# Patient Record
Sex: MALE | Race: WHITE | Employment: FULL TIME
[De-identification: names, ages, dates, MRNs, and addresses within clinical notes are randomized per-mention and may not be internally consistent; named-entity substitution may affect disease eponyms.]

---

## 2023-02-12 ENCOUNTER — HOSPITAL ENCOUNTER (EMERGENCY)
Facility: HOSPITAL | Age: 38
Discharge: HOME | End: 2023-02-13
Attending: EMERGENCY MEDICINE
Payer: COMMERCIAL

## 2023-02-12 DIAGNOSIS — N20.1 URETERIC STONE: ICD-10-CM

## 2023-02-12 DIAGNOSIS — N20.0 KIDNEY STONE: Primary | ICD-10-CM

## 2023-02-12 PROCEDURE — 80048 BASIC METABOLIC PNL TOTAL CA: CPT

## 2023-02-12 PROCEDURE — 96374 THER/PROPH/DIAG INJ IV PUSH: CPT

## 2023-02-12 PROCEDURE — 99284 EMERGENCY DEPT VISIT MOD MDM: CPT

## 2023-02-12 PROCEDURE — 85025 COMPLETE CBC W/AUTO DIFF WBC: CPT

## 2023-02-12 PROCEDURE — 81001 URINALYSIS AUTO W/SCOPE: CPT

## 2023-02-13 ENCOUNTER — APPOINTMENT (OUTPATIENT)
Facility: HOSPITAL | Age: 38
End: 2023-02-13
Payer: COMMERCIAL

## 2023-02-13 VITALS
BODY MASS INDEX: 34.3 KG/M2 | RESPIRATION RATE: 22 BRPM | HEIGHT: 71 IN | TEMPERATURE: 97.8 F | HEART RATE: 86 BPM | DIASTOLIC BLOOD PRESSURE: 78 MMHG | SYSTOLIC BLOOD PRESSURE: 128 MMHG | WEIGHT: 245 LBS | OXYGEN SATURATION: 96 %

## 2023-02-13 LAB
ANION GAP SERPL CALC-SCNC: 5 MMOL/L (ref 3–18)
APPEARANCE UR: ABNORMAL
BACTERIA URNS QL MICRO: NEGATIVE /HPF
BASOPHILS # BLD: 0.1 K/UL (ref 0–0.1)
BASOPHILS NFR BLD: 1 % (ref 0–2)
BILIRUB UR QL: NEGATIVE
BUN SERPL-MCNC: 13 MG/DL (ref 7–18)
BUN/CREAT SERPL: 11 (ref 12–20)
CALCIUM SERPL-MCNC: 8.9 MG/DL (ref 8.5–10.1)
CAOX CRY URNS QL MICRO: ABNORMAL
CHLORIDE SERPL-SCNC: 105 MMOL/L (ref 100–111)
CO2 SERPL-SCNC: 28 MMOL/L (ref 21–32)
COLOR UR: YELLOW
CREAT SERPL-MCNC: 1.15 MG/DL (ref 0.6–1.3)
DIFFERENTIAL METHOD BLD: ABNORMAL
EOSINOPHIL # BLD: 0.1 K/UL (ref 0–0.4)
EOSINOPHIL NFR BLD: 1 % (ref 0–5)
EPITH CASTS URNS QL MICRO: ABNORMAL /LPF (ref 0–5)
ERYTHROCYTE [DISTWIDTH] IN BLOOD BY AUTOMATED COUNT: 12.7 % (ref 11.6–14.5)
GLUCOSE SERPL-MCNC: 134 MG/DL (ref 74–99)
GLUCOSE UR STRIP.AUTO-MCNC: NEGATIVE MG/DL
HCT VFR BLD AUTO: 43.3 % (ref 36–48)
HGB BLD-MCNC: 15.2 G/DL (ref 13–16)
HGB UR QL STRIP: ABNORMAL
IMM GRANULOCYTES # BLD AUTO: 0 K/UL (ref 0–0.04)
IMM GRANULOCYTES NFR BLD AUTO: 0 % (ref 0–0.5)
KETONES UR QL STRIP.AUTO: ABNORMAL MG/DL
LEUKOCYTE ESTERASE UR QL STRIP.AUTO: ABNORMAL
LYMPHOCYTES # BLD: 1.3 K/UL (ref 0.9–3.6)
LYMPHOCYTES NFR BLD: 19 % (ref 21–52)
MCH RBC QN AUTO: 30.2 PG (ref 24–34)
MCHC RBC AUTO-ENTMCNC: 35.1 G/DL (ref 31–37)
MCV RBC AUTO: 86.1 FL (ref 78–100)
MONOCYTES # BLD: 0.5 K/UL (ref 0.05–1.2)
MONOCYTES NFR BLD: 7 % (ref 3–10)
NEUTS SEG # BLD: 4.9 K/UL (ref 1.8–8)
NEUTS SEG NFR BLD: 72 % (ref 40–73)
NITRITE UR QL STRIP.AUTO: NEGATIVE
NRBC # BLD: 0 K/UL (ref 0–0.01)
NRBC BLD-RTO: 0 PER 100 WBC
PH UR STRIP: 5.5 (ref 5–8)
PLATELET # BLD AUTO: 232 K/UL (ref 135–420)
PMV BLD AUTO: 11.2 FL (ref 9.2–11.8)
POTASSIUM SERPL-SCNC: 3.8 MMOL/L (ref 3.5–5.5)
PROT UR STRIP-MCNC: 30 MG/DL
RBC # BLD AUTO: 5.03 M/UL (ref 4.35–5.65)
RBC #/AREA URNS HPF: >100 /HPF (ref 0–5)
SODIUM SERPL-SCNC: 138 MMOL/L (ref 136–145)
SP GR UR REFRACTOMETRY: 1.02 (ref 1–1.03)
UROBILINOGEN UR QL STRIP.AUTO: 0.2 EU/DL (ref 0.2–1)
WBC # BLD AUTO: 6.8 K/UL (ref 4.6–13.2)
WBC URNS QL MICRO: ABNORMAL /HPF (ref 0–5)

## 2023-02-13 PROCEDURE — 6360000002 HC RX W HCPCS: Performed by: EMERGENCY MEDICINE

## 2023-02-13 PROCEDURE — 74176 CT ABD & PELVIS W/O CONTRAST: CPT

## 2023-02-13 RX ORDER — ONDANSETRON 4 MG/1
4 TABLET, FILM COATED ORAL DAILY PRN
Qty: 30 TABLET | Refills: 0 | Status: SHIPPED | OUTPATIENT
Start: 2023-02-13

## 2023-02-13 RX ORDER — IBUPROFEN 600 MG/1
600 TABLET ORAL EVERY 6 HOURS PRN
Qty: 120 TABLET | Refills: 0 | Status: SHIPPED | OUTPATIENT
Start: 2023-02-13

## 2023-02-13 RX ORDER — TAMSULOSIN HYDROCHLORIDE 0.4 MG/1
0.4 CAPSULE ORAL DAILY
Qty: 7 CAPSULE | Refills: 0 | Status: SHIPPED | OUTPATIENT
Start: 2023-02-13 | End: 2023-02-20

## 2023-02-13 RX ORDER — KETOROLAC TROMETHAMINE 15 MG/ML
30 INJECTION, SOLUTION INTRAMUSCULAR; INTRAVENOUS
Status: COMPLETED | OUTPATIENT
Start: 2023-02-13 | End: 2023-02-13

## 2023-02-13 RX ORDER — PROCHLORPERAZINE EDISYLATE 5 MG/ML
10 INJECTION INTRAMUSCULAR; INTRAVENOUS
Status: DISCONTINUED | OUTPATIENT
Start: 2023-02-13 | End: 2023-02-13

## 2023-02-13 RX ORDER — DIPHENHYDRAMINE HYDROCHLORIDE 50 MG/ML
25 INJECTION INTRAMUSCULAR; INTRAVENOUS
Status: DISCONTINUED | OUTPATIENT
Start: 2023-02-13 | End: 2023-02-13

## 2023-02-13 RX ADMIN — KETOROLAC TROMETHAMINE 30 MG: 15 INJECTION, SOLUTION INTRAMUSCULAR; INTRAVENOUS at 04:26

## 2023-02-13 NOTE — ED TRIAGE NOTES
Pt arrived with c.o L sided flank pain that started around 8pm this evening. Pt also endorses vomiting this evening. Denies diarrhea. Denies abd pain. Pt worried it might be a kidney stone.

## 2023-02-13 NOTE — ED PROVIDER NOTES
EMERGENCY DEPARTMENT HISTORY AND PHYSICAL EXAM      Date: 2/12/2023  Patient Name: Delle Phoenix    History of Presenting Illness     Chief Complaint   Patient presents with    Flank Pain       History Provided By: Patient    HPI: Delle Phoenix, 42 y.o. male with no pertinent past medical history presents the emergency room with chief complaint of left flank pain, nausea vomiting. Patient noted onset of symptoms earlier today. He describes that pain initially is moderate to severe, has somewhat improved since onset. Pain begins in the left lower back and radiates to his left lower quadrant. He does have some associated nausea with several episodes of emesis earlier today. Pt denies any other alleviating or exacerbating factors. Additionally, pt specifically denies any recent fever, chills, headache,  CP, SOB, lightheadedness, dizziness, numbness, weakness, BLE swelling, heart palpitations, urinary sxs, diarrhea, constipation, melena, hematochezia, cough, or congestion. PCP: PROVIDER LUKE, MD    No current facility-administered medications for this encounter. Current Outpatient Medications   Medication Sig Dispense Refill    ondansetron (ZOFRAN) 4 MG tablet Take 1 tablet by mouth daily as needed for Nausea or Vomiting 30 tablet 0    ibuprofen (IBU) 600 MG tablet Take 1 tablet by mouth every 6 hours as needed for Pain 120 tablet 0    tamsulosin (FLOMAX) 0.4 MG capsule Take 1 capsule by mouth daily for 7 days 7 capsule 0       Past History     Past Medical History:  History reviewed, no pertinent past medical history    Past Surgical History:  No past surgical history on file. Family History:  No family history on file. Social History:  No current heavy alcohol or illicit drug use    Allergies:   Allergies   Allergen Reactions    Hydrocodone-Acetaminophen Nausea And Vomiting         Review of Systems   Review of Systems  Pertinent positives and negatives in HPI    Physical Exam   Physical Exam    GENERAL: alert and oriented, no acute distress  EYES: PEERL, No injection, discharge or icterus. ENT: Mucous membranes pink and moist.  NECK: Supple  LUNGS: Airway patent. Non-labored respirations. Breath sounds clear with good air entry bilaterally. HEART: Regular rate and rhythm. No peripheral edema  ABDOMEN: Non-distended and non-tender, without guarding or rebound. SKIN:  warm, dry  MSK/EXTREMITIES: Without swelling, tenderness or deformity, symmetric with normal ROM  NEUROLOGICAL: Alert, oriented      Diagnostic Study Results     Labs -     No results found for this or any previous visit (from the past 12 hour(s)). Radiologic Studies -   CT ABDOMEN PELVIS WO CONTRAST Additional Contrast? None   Final Result              Medical Decision Making       I reviewed the vital signs, available nursing notes, past medical history, past surgical history, family history and social history. Vital Signs-Reviewed the patient's vital signs. Patient Vitals for the past 12 hrs:   BP SpO2   02/13/23 0355 128/78 96 %   02/13/23 0315 -- 98 %       Provider Notes (Medical Decision Making): The patient presents with a chief complaint of abdominal pain. Differential diagnosis is broad and includes acute appendicitis, acute cholecystitis, pancreatitis, gastritis, PUD, diverticulitis, mesenteric ischemia, abdominal aortic aneurysm, aortic dissection, bowel obstruction, enteritis, colitis, fecal impaction, volvulus, IBS, inflammatory bowel disease, specific food intolerance, peritonitis, perforated viscous, malignancy, UTI, abscess, testicular torsion, epididymitis, orchitis, testicular torsion and abdominal pain NOS. Basic labs my interpretation showed no clinically significant abnormalities, no sign of infection on urinalysis my interpretation. CT scan of the abdomen pelvis was obtained which showed obstructed left ureteral stone which would explain the patient's symptoms.   Patient was given Toradol with improvement, pain is well tolerated at this time is felt appropriate for outpatient management. Pt is tolerating po. The patient states that their symptoms have improved and he feels much better. There are no other new complaints at this time      There were no concerns for any acute life-threatening or surgical pathology that would warrant admission at this time. Vital signs were within acceptable limits at the time of discharge. Patient was in stable condition and felt to be reliable for outpatient management. There were no lab findings of immediate concern. The patient was advised to return to the emergency room for acutely worsening pain, persistence of pain, fevers, , intractable vomiting , inability to tolerate food/liquids, syncope, or change in mental status. Otherwise, the patient is encouraged to follow-up with urology. The patient understood the work-up and assessment and had no further questions. The patient was discharged home in stable clinical condition. ED Course:   Initial assessment performed. The patients presenting problems have been discussed, and they are in agreement with the care plan formulated and outlined with them. I have encouraged them to ask questions as they arise throughout their visit. Patient was given the following medications:  Medications   ketorolac (TORADOL) injection 30 mg (30 mg IntraVENous Given 2/13/23 0426)          [address]  results have been reviewed with him. He has been counseled regarding his diagnosis. He verbally conveys understanding and agreement of the signs, symptoms, diagnosis, treatment and prognosis and additionally agrees to follow up as recommended. He also agrees with the care-plan and conveys that all of his questions have been answered.   I have also put together some discharge instructions for him that include: 1) educational information regarding their diagnosis, 2) how to care for their diagnosis at home, as well a 3) list of reasons why they would want to return to the ED prior to their follow-up appointment, should their condition change. Disposition:  home    PLAN:  1. Medication List        START taking these medications      ibuprofen 600 MG tablet  Commonly known as: IBU  Take 1 tablet by mouth every 6 hours as needed for Pain     ondansetron 4 MG tablet  Commonly known as: ZOFRAN  Take 1 tablet by mouth daily as needed for Nausea or Vomiting     tamsulosin 0.4 MG capsule  Commonly known as: FLOMAX  Take 1 capsule by mouth daily for 7 days               Where to Get Your Medications        These medications were sent to [place] [id number] [facility] [phone] - [phone]  [facility], [facility] [address]      Phone: [phone]   ibuprofen 600 MG tablet  ondansetron 4 MG tablet  tamsulosin 0.4 MG capsule       2. [unfilled]    Return to ED if worse     Diagnosis     Clinical Impression:   1. Kidney stone    2. Ureteric stone          I, Liang Garcia MD am the first provider for this patient and am the attending of record for this patient encounter. Liang Garcia MD        Please note that this dictation was completed with Dragon, computer voice recognition software. Quite often unanticipated grammatical, syntax, homophones, and other interpretive errors are inadvertently transcribed by the computer software. Please disregard these errors. Additionally, please excuse any errors that have escaped final proofreading.               Yamileth Lassiter MD  02/13/23 6100

## 2023-03-10 ENCOUNTER — TRANSCRIBE ORDERS (OUTPATIENT)
Facility: HOSPITAL | Age: 38
End: 2023-03-10

## 2023-03-10 DIAGNOSIS — N20.0 KIDNEY STONE: Primary | ICD-10-CM

## 2023-03-20 ENCOUNTER — HOSPITAL ENCOUNTER (OUTPATIENT)
Facility: HOSPITAL | Age: 38
Setting detail: OUTPATIENT SURGERY
Discharge: HOME | End: 2023-03-20
Attending: UROLOGY | Admitting: UROLOGY
Payer: COMMERCIAL

## 2023-03-20 VITALS
WEIGHT: 252.5 LBS | DIASTOLIC BLOOD PRESSURE: 78 MMHG | HEART RATE: 102 BPM | OXYGEN SATURATION: 95 % | SYSTOLIC BLOOD PRESSURE: 133 MMHG | HEIGHT: 71 IN | TEMPERATURE: 98.2 F | BODY MASS INDEX: 35.35 KG/M2 | RESPIRATION RATE: 18 BRPM

## 2023-03-20 DIAGNOSIS — N13.2 URETERAL STONE WITH HYDRONEPHROSIS: Primary | ICD-10-CM

## 2023-03-20 PROCEDURE — 7100000011 HC PHASE II RECOVERY - ADDTL 15 MIN: Performed by: UROLOGY

## 2023-03-20 PROCEDURE — 2709999900 HC NON-CHARGEABLE SUPPLY: Performed by: UROLOGY

## 2023-03-20 PROCEDURE — 3600000001 HC SURGERY LEVEL 1  BASE: Performed by: UROLOGY

## 2023-03-20 PROCEDURE — 50590 FRAGMENTING OF KIDNEY STONE: CPT

## 2023-03-20 PROCEDURE — 6360000002 HC RX W HCPCS: Performed by: UROLOGY

## 2023-03-20 PROCEDURE — 2580000003 HC RX 258: Performed by: UROLOGY

## 2023-03-20 PROCEDURE — 99152 MOD SED SAME PHYS/QHP 5/>YRS: CPT | Performed by: UROLOGY

## 2023-03-20 PROCEDURE — 3600000011 HC SURGERY LEVEL 1  ADDTL 15MIN: Performed by: UROLOGY

## 2023-03-20 PROCEDURE — 99153 MOD SED SAME PHYS/QHP EA: CPT | Performed by: UROLOGY

## 2023-03-20 PROCEDURE — 7100000010 HC PHASE II RECOVERY - FIRST 15 MIN: Performed by: UROLOGY

## 2023-03-20 RX ORDER — PROMETHAZINE HYDROCHLORIDE 25 MG/1
TABLET ORAL
COMMUNITY
Start: 2018-03-21

## 2023-03-20 RX ORDER — SODIUM CHLORIDE, SODIUM LACTATE, POTASSIUM CHLORIDE, CALCIUM CHLORIDE 600; 310; 30; 20 MG/100ML; MG/100ML; MG/100ML; MG/100ML
INJECTION, SOLUTION INTRAVENOUS CONTINUOUS
Status: DISCONTINUED | OUTPATIENT
Start: 2023-03-20 | End: 2023-03-20 | Stop reason: HOSPADM

## 2023-03-20 RX ORDER — FLUMAZENIL 0.1 MG/ML
0.5 INJECTION INTRAVENOUS ONCE
Status: DISCONTINUED | OUTPATIENT
Start: 2023-03-20 | End: 2023-03-20 | Stop reason: HOSPADM

## 2023-03-20 RX ORDER — NALOXONE HYDROCHLORIDE 0.4 MG/ML
1 INJECTION, SOLUTION INTRAMUSCULAR; INTRAVENOUS; SUBCUTANEOUS PRN
Status: DISCONTINUED | OUTPATIENT
Start: 2023-03-20 | End: 2023-03-20 | Stop reason: HOSPADM

## 2023-03-20 RX ORDER — FENTANYL CITRATE 50 UG/ML
300 INJECTION, SOLUTION INTRAMUSCULAR; INTRAVENOUS PRN
Status: DISCONTINUED | OUTPATIENT
Start: 2023-03-20 | End: 2023-03-20 | Stop reason: HOSPADM

## 2023-03-20 RX ORDER — OXYCODONE HYDROCHLORIDE AND ACETAMINOPHEN 5; 325 MG/1; MG/1
1 TABLET ORAL EVERY 4 HOURS PRN
Status: DISCONTINUED | OUTPATIENT
Start: 2023-03-20 | End: 2023-03-20 | Stop reason: HOSPADM

## 2023-03-20 RX ORDER — MIDAZOLAM HYDROCHLORIDE 1 MG/ML
6 INJECTION INTRAMUSCULAR; INTRAVENOUS PRN
Status: DISCONTINUED | OUTPATIENT
Start: 2023-03-20 | End: 2023-03-20 | Stop reason: HOSPADM

## 2023-03-20 RX ORDER — TRAMADOL HYDROCHLORIDE 50 MG/1
50 TABLET ORAL EVERY 6 HOURS PRN
Qty: 20 TABLET | Refills: 0 | Status: SHIPPED | OUTPATIENT
Start: 2023-03-20 | End: 2023-03-27

## 2023-03-20 RX ADMIN — SODIUM CHLORIDE, SODIUM LACTATE, POTASSIUM CHLORIDE, AND CALCIUM CHLORIDE: 600; 310; 30; 20 INJECTION, SOLUTION INTRAVENOUS at 16:05

## 2023-03-20 RX ADMIN — CEFAZOLIN 2000 MG: 10 INJECTION, POWDER, FOR SOLUTION INTRAVENOUS at 17:00

## 2023-03-20 ASSESSMENT — PAIN SCALES - GENERAL
PAINLEVEL_OUTOF10: 5
PAINLEVEL_OUTOF10: 5

## 2023-03-20 ASSESSMENT — PAIN DESCRIPTION - ORIENTATION
ORIENTATION: LEFT
ORIENTATION: LEFT

## 2023-03-20 ASSESSMENT — PAIN DESCRIPTION - DESCRIPTORS
DESCRIPTORS: ACHING
DESCRIPTORS: ACHING

## 2023-03-20 ASSESSMENT — PAIN DESCRIPTION - LOCATION
LOCATION: BUTTOCKS
LOCATION: BUTTOCKS

## 2023-03-20 ASSESSMENT — PAIN - FUNCTIONAL ASSESSMENT: PAIN_FUNCTIONAL_ASSESSMENT: 0-10

## 2023-03-20 NOTE — DISCHARGE INSTRUCTIONS
2 [facility], Urology     [facility], [address], [address], Ludie [address]inski, [place]  Office: [phone]  Fax:    [phone]    PROCEDURE     Left ureteral eswl  __  NOTIFY [facility] IMMEDIATELY IF ANY OF THE FOLLOWING OCCUR:    You are unable to urinate. Urgency to urinate is not uncommon. You find yourself urinating small frequent amounts associated with severe lower abdominal discomfort. Bright red blood with clots in the urine. Some reddish urine is not uncommon and should be treated with increasing the amount of fluids you drink. Temperature above 101.5° and / or chills. You are nauseous and / or vomiting and you cannot hold down any fluids. Your pain is not controlled with the pain medication prescribed. SPECIAL CONSIDERATIONS:     Do not drive for at least 24 hours after the procedure and until you are no longer taking narcotic pain medication and you are able to move and react without hesitation. You may return to work in 24-48 hours          _x_  Strain all urine and bring all stone fragments to your follow up visit. _x_  No heavy lifting over 10 pounds & no strenuous exercise for 1 week    _x_  Our office will call you to make an appointment in 2-3 weeks. Please contact [facility] at [phone] or go to the nearest Emergency Department / Urgent Care facility for any other medical questions or concerns.

## 2023-03-20 NOTE — OP NOTE
Operative Note      Patient: Anita Olea  YOB: 1985  MRN: [id number]    Date of Procedure: 3/20/2023    Pre-Op Diagnosis: CALCULUS OF Shahzad Caulk    Post-Op Diagnosis: Same       Procedure(s):  LEFT URETERAL EXTRA CORPAREAL SHOCKWAVE LITHOTRIPSY    Surgeon(s):  Maribeth Rose MD    Assistant:   * No surgical staff found *    Anesthesia: IV Sedation    Estimated Blood Loss (mL): 0 ml    Complications: None    Specimens:   * No specimens in log *    Implants:  * No implants in log *      Drains: * No LDAs found *    Findings: Left distal ureteral stone near UVJ. Description of Procedure: The patient was brought into the OR and was identified. Time out was called using two identifiers. He received conscious sedation using fentanyl and versed as needed by the nurse. The F1 and F2 focus points were used to locate the stone on the Left distal ureter near UVJ. Once the stone was localized, the [employer] lithotripter machine was used for the procedure. The rate was 90 shocks per minute. Power was increased from 3 to 7. A total shock of 4000 was applied to the stone. At the completion, the stone looked to be fragmented well. The patient tolerated the procedure very well. He was aroused safely and transferred to the PACU in stable condition. Plan:  KUB in 2 weeks prior to office visit.       Electronically signed by Rose Winter MD on 3/20/2023 at 5:52 PM

## 2023-03-20 NOTE — PRE SEDATION
Sedation Pre-Procedure Note    Patient Name: Anita Olea   YOB: 1985  Room/Bed: OR/PL  Medical Record Number: [id number]  Date: 3/20/2023   Time: 4:55 PM       Indication:  left ureteral stone    Consent: I have discussed with the patient and/or the patient representative the indication, alternatives, and the possible risks and/or complications of the planned procedure and the anesthesia methods. The patient and/or patient representative appear to understand and agree to proceed. Vital Signs:   Vitals:    03/20/23 1535   BP: (!) 158/87   Pulse: 99   Resp: 18   Temp: 98.2 °F (36.8 °C)   SpO2: 100%       Past Medical History:   has a past medical history of Exercise tolerance finding, Kidney stone, and Wears glasses. Past Surgical History:   has a past surgical history that includes Vasectomy (2018) and [place] tooth extraction (2000). Medications:   Scheduled Meds:    ceFAZolin  2,000 mg IntraVENous On Call to OR    flumazenil  0.5 mg IntraVENous Once     Continuous Infusions:    lactated ringers IV soln 125 mL/hr at 03/20/23 1605    lactated ringers IV soln       PRN Meds: naloxone, midazolam, fentaNYL, oxyCODONE-acetaminophen  Home Meds:   Prior to Admission medications    Medication Sig Start Date End Date Taking?  Authorizing Provider   Multiple Vitamins-Iron TABS MULTIVITAMIN ADULT TABS 3/21/18  Yes Historical Provider, MD   tamsulosin (FLOMAX) 0.4 MG capsule Take 0.4 mg by mouth daily Indications: kidney stone    Historical Provider, MD   cetirizine (ZYRTEC) 10 MG tablet Take 10 mg by mouth daily Indications: allergies    Historical Provider, MD   fluticasone (FLONASE) 50 MCG/ACT nasal spray 1 spray by Each Nostril route daily Indications: allergies    Historical Provider, MD   ibuprofen (IBU) 600 MG tablet Take 1 tablet by mouth every 6 hours as needed for Pain 2/13/23   Jessica Grossman MD     Coumadin Use Last 7 Days:  no  Antiplatelet drug therapy use last 7 days: no  Other 04-Apr-2022 09:33

## 2023-03-20 NOTE — H&P
Update History & Physical    The patient's History and Physical of March 10, 2023 was reviewed with the patient and I examined the patient. There was no change. The surgical site was confirmed by the patient and me. kUB 3/16 5x3mm left UVJ stone. Unchanged. Plan: The risks, benefits, expected outcome, and alternative to the recommended procedure have been discussed with the patient. Patient understands and wants to proceed with the procedure. Electronically signed by Regina Kaufman MD on 3/20/2023 at 4:53 PM        [facility] [place]  [address]  Tel: [phone]  Fax: [phone]    Patient :  [place] Marker  YOB: 1985  Birth Sex:  Male  Current Gender:  Male  Date:   03/10/2023 10:15 AM   Visit Type:    Office Visit  Assessment/Plan  #  Detail Type  Description   1. Assessment  Renal calculi (N20.0). Patient Plan  Patient thought he had a 2 mm stone and we just received his CT scan and it was actually 4 mm left mid ureteral stone. He has an order for tamsulosin but he did not take any as he has had absolutely no signs and symptoms of renal colic. Made patient aware that the stone could possibly still be there is a or 2 mm I would be sure it was gone but it being 4 mm and he is been straining his urine consistently he has not passed a 4 mm stone. Plan:  1. CT stone protocol for mid left ureteral 4 mm stone presence or absence. If the stone remains patient needs come back in to discuss surgical options and if the stone has passed then he will get a phone call for me letting him know that they are gone and he can follow-up on an as-needed basis. Additional Visit Information      This 42year old male presents for Kidney stones. History of Present Illness  1. Kidney stones   Onset was sudden. Severity level is moderate-severe. The problem is resolved. Location of pain is left flank.  CT, ureters and kidneys taken 02/12/2023 reveals a 2 mm stone in the left. Recent ER visit on 02/12/2023 at [facility]. Pertinent history includes family history of stones. There are no aggravating factors. Relieving factors include analgesics. Pertinent negatives include back pain, chills, constipation, diarrhea, dysuria, fever, nausea, pain, suprapubic pain, dribbling (urinary) and vomiting. Additional information: 42year old male patient with his first kidney stone. Strong family hx of stones on fathers side. Pt. reports 2 mm stone at left ureter CT results are not available. He thinks he passed stone as he has no renal colic S&S for 2 weeks. no strainer from ER. Denies fever and chills. .       Comments: 03/10/2023:  Patient returns urology clinic today for 1-2 week follow-up for his 4 mm left mid ureteral stone. Patient at our 1st meeting thought he had a 2 mm left ureteral stone but actually on CT scan which we just received was a 4 mm. He has punctate nonobstructing stones in the right kidney. Patient was not having any pain at the time I saw him last and we did prescribe Flomax from last time he was here and he did not need it. Denies flank pain, hematuria, dysuria, urgency frequency, nausea vomiting, fever or chills. Past Medical/Surgical History   (Reviewed, updated)  Disease/disorder  Onset Date  Management  Date  Comments      Vasectomy          Medications (active prior to today)  Medication  Instructions  Start Date  Stop Date  Refilled  Elsewhere  ketorolac 10 mg tablet  take 1 tablet by oral route  every 6 hours as needed for up to 5 days total use as needed  02/27/2023      N  tamsulosin 0.4 mg capsule  take 1 capsule by oral route  every day 1/2 hour following the same meal each day  02/27/2023      N      Medication Reconciliation  Medications reconciled today.       Allergies  Ingredient  Reaction (Severity)  Medication Name  Comment  HYDROCODONE  Nausea/vomiting            Family History   (Reviewed, updated)    Relationship  Family Member Name    Age at Death  Condition  Onset Age  Cause of Death  Family h/o        Kidney stones        Social History  (Reviewed, updated)  Tobacco use reviewed. Preferred language is [place]. Marital Status/Family/Social Support  Marital status:     Tobacco use status: Current non-smoker. Smoking status: Never smoker. Tobacco Screening  Patient has never used tobacco. Patient has not used tobacco in the last 30 days. Patient has not used smokeless tobacco in the last 30 days. Smoking Status  Type  Smoking Status  Usage Per Day  Years Used  Pack Years  Total Pack Years    Never smoker            Alcohol  There is a history of alcohol use. Caffeine  The patient uses caffeine: coffee. Review of Systems  System  Neg/Pos  Details  Constitutional  Negative  Chills and Fever. ENMT  Negative  Ear infections and Sore throat. Eyes  Negative  Blurred vision, Double vision and Eye pain. Respiratory  Negative  Asthma, Chronic cough, Dyspnea and Wheezing. Cardio  Negative  Chest pain. GI  Negative  Constipation, Decreased appetite, Diarrhea, Nausea and Vomiting. Endocrine  Negative  Cold intolerance, Heat intolerance, Increased thirst and Weight loss. Neuro  Negative  Headache and Tremors. Psych  Negative  Anxiety and Depression. Integumentary  Negative  Itching skin and Rash. MS  Negative  Back pain and Joint pain. Hema/Lymph  Negative  Easy bleeding. Reproductive  Negative  Sexual dysfunction.       Vital Signs  Height  Time  ft  in  cm  Last Measured  Height Position  10:50 AM  5.0  11.00  180.34  2023  0    Weight/BSA/BMI  Time  lb  oz  kg  Context  BMI kg/m2  BSA m2  10:50 AM  254.00    115.212  dressed with shoes  35.43      Temperature/Pulse/Respiration  Time  Temp F  Temp C  Temp Site  Pulse/min  Pattern  Resp/ min  10:50 AM  98.30  36.83  ear          Measured by  Time  Measured by  10:50 AM  [facility] Day    Physical

## 2024-09-21 ENCOUNTER — HOSPITAL ENCOUNTER (EMERGENCY)
Facility: HOSPITAL | Age: 39
Discharge: HOME | End: 2024-09-22
Attending: EMERGENCY MEDICINE
Payer: COMMERCIAL

## 2024-09-21 ENCOUNTER — APPOINTMENT (OUTPATIENT)
Facility: HOSPITAL | Age: 39
End: 2024-09-21
Payer: COMMERCIAL

## 2024-09-21 DIAGNOSIS — K76.0 HEPATIC STEATOSIS: ICD-10-CM

## 2024-09-21 DIAGNOSIS — R10.9 RIGHT FLANK PAIN: ICD-10-CM

## 2024-09-21 DIAGNOSIS — N20.0 KIDNEY STONE: Primary | ICD-10-CM

## 2024-09-21 LAB
BASOPHILS # BLD: 0 K/UL (ref 0–0.1)
BASOPHILS NFR BLD: 0 % (ref 0–2)
DIFFERENTIAL METHOD BLD: ABNORMAL
EOSINOPHIL # BLD: 0 K/UL (ref 0–0.4)
EOSINOPHIL NFR BLD: 0 % (ref 0–5)
ERYTHROCYTE [DISTWIDTH] IN BLOOD BY AUTOMATED COUNT: 12.7 % (ref 11.6–14.5)
HCT VFR BLD AUTO: 40.8 % (ref 36–48)
HGB BLD-MCNC: 14.5 G/DL (ref 13–16)
IMM GRANULOCYTES # BLD AUTO: 0 K/UL (ref 0–0.04)
IMM GRANULOCYTES NFR BLD AUTO: 0 % (ref 0–0.5)
LYMPHOCYTES # BLD: 1 K/UL (ref 0.9–3.6)
LYMPHOCYTES NFR BLD: 9 % (ref 21–52)
MCH RBC QN AUTO: 30.5 PG (ref 24–34)
MCHC RBC AUTO-ENTMCNC: 35.5 G/DL (ref 31–37)
MCV RBC AUTO: 85.7 FL (ref 78–100)
MONOCYTES # BLD: 0.6 K/UL (ref 0.05–1.2)
MONOCYTES NFR BLD: 5 % (ref 3–10)
NEUTS SEG # BLD: 9.3 K/UL (ref 1.8–8)
NEUTS SEG NFR BLD: 85 % (ref 40–73)
NRBC # BLD: 0 K/UL (ref 0–0.01)
NRBC BLD-RTO: 0 PER 100 WBC
PLATELET # BLD AUTO: 223 K/UL (ref 135–420)
PMV BLD AUTO: 11.1 FL (ref 9.2–11.8)
RBC # BLD AUTO: 4.76 M/UL (ref 4.35–5.65)
WBC # BLD AUTO: 11 K/UL (ref 4.6–13.2)

## 2024-09-21 PROCEDURE — 83690 ASSAY OF LIPASE: CPT

## 2024-09-21 PROCEDURE — 2580000003 HC RX 258: Performed by: EMERGENCY MEDICINE

## 2024-09-21 PROCEDURE — 74176 CT ABD & PELVIS W/O CONTRAST: CPT

## 2024-09-21 PROCEDURE — 80053 COMPREHEN METABOLIC PANEL: CPT

## 2024-09-21 PROCEDURE — 85025 COMPLETE CBC W/AUTO DIFF WBC: CPT

## 2024-09-21 PROCEDURE — 99284 EMERGENCY DEPT VISIT MOD MDM: CPT

## 2024-09-21 PROCEDURE — 81001 URINALYSIS AUTO W/SCOPE: CPT

## 2024-09-21 RX ORDER — 0.9 % SODIUM CHLORIDE 0.9 %
1000 INTRAVENOUS SOLUTION INTRAVENOUS ONCE
Status: COMPLETED | OUTPATIENT
Start: 2024-09-21 | End: 2024-09-22

## 2024-09-21 RX ADMIN — SODIUM CHLORIDE 1000 ML: 9 INJECTION, SOLUTION INTRAVENOUS at 23:41

## 2024-09-21 ASSESSMENT — PAIN - FUNCTIONAL ASSESSMENT: PAIN_FUNCTIONAL_ASSESSMENT: 0-10

## 2024-09-21 ASSESSMENT — PAIN SCALES - GENERAL: PAINLEVEL_OUTOF10: 7

## 2024-09-22 VITALS
RESPIRATION RATE: 16 BRPM | HEIGHT: 71 IN | SYSTOLIC BLOOD PRESSURE: 150 MMHG | BODY MASS INDEX: 35.14 KG/M2 | WEIGHT: 251 LBS | HEART RATE: 72 BPM | DIASTOLIC BLOOD PRESSURE: 92 MMHG | TEMPERATURE: 98.1 F | OXYGEN SATURATION: 98 %

## 2024-09-22 LAB
ALBUMIN SERPL-MCNC: 4.3 G/DL (ref 3.4–5)
ALBUMIN/GLOB SERPL: 1.1 (ref 0.8–1.7)
ALP SERPL-CCNC: 72 U/L (ref 45–117)
ALT SERPL-CCNC: 58 U/L (ref 16–61)
ANION GAP SERPL CALC-SCNC: 9 MMOL/L (ref 3–18)
APPEARANCE UR: ABNORMAL
AST SERPL-CCNC: 32 U/L (ref 10–38)
BACTERIA URNS QL MICRO: ABNORMAL /HPF
BILIRUB SERPL-MCNC: 0.4 MG/DL (ref 0.2–1)
BILIRUB UR QL: NEGATIVE
BUN SERPL-MCNC: 17 MG/DL (ref 7–18)
BUN/CREAT SERPL: 14 (ref 12–20)
CALCIUM SERPL-MCNC: 9.3 MG/DL (ref 8.5–10.1)
CHLORIDE SERPL-SCNC: 102 MMOL/L (ref 100–111)
CO2 SERPL-SCNC: 23 MMOL/L (ref 21–32)
COLOR UR: YELLOW
CREAT SERPL-MCNC: 1.2 MG/DL (ref 0.6–1.3)
EPITH CASTS URNS QL MICRO: ABNORMAL /LPF (ref 0–5)
GLOBULIN SER CALC-MCNC: 3.8 G/DL (ref 2–4)
GLUCOSE SERPL-MCNC: 129 MG/DL (ref 74–99)
GLUCOSE UR STRIP.AUTO-MCNC: NEGATIVE MG/DL
HGB UR QL STRIP: ABNORMAL
KETONES UR QL STRIP.AUTO: NEGATIVE MG/DL
LEUKOCYTE ESTERASE UR QL STRIP.AUTO: NEGATIVE
LIPASE SERPL-CCNC: 40 U/L (ref 13–75)
NITRITE UR QL STRIP.AUTO: NEGATIVE
PH UR STRIP: 5.5 (ref 5–8)
POTASSIUM SERPL-SCNC: 3.6 MMOL/L (ref 3.5–5.5)
PROT SERPL-MCNC: 8.1 G/DL (ref 6.4–8.2)
PROT UR STRIP-MCNC: ABNORMAL MG/DL
RBC #/AREA URNS HPF: ABNORMAL /HPF (ref 0–5)
SODIUM SERPL-SCNC: 134 MMOL/L (ref 136–145)
SP GR UR REFRACTOMETRY: 1.03 (ref 1–1.03)
UROBILINOGEN UR QL STRIP.AUTO: 0.2 EU/DL (ref 0.2–1)
WBC URNS QL MICRO: ABNORMAL /HPF (ref 0–5)

## 2024-09-22 PROCEDURE — 96361 HYDRATE IV INFUSION ADD-ON: CPT

## 2024-09-22 PROCEDURE — 96360 HYDRATION IV INFUSION INIT: CPT

## 2024-09-22 RX ORDER — KETOROLAC TROMETHAMINE 10 MG/1
10 TABLET, FILM COATED ORAL EVERY 6 HOURS PRN
Qty: 20 TABLET | Refills: 0 | Status: SHIPPED | OUTPATIENT
Start: 2024-09-22

## 2024-09-22 RX ORDER — TAMSULOSIN HYDROCHLORIDE 0.4 MG/1
0.4 CAPSULE ORAL DAILY
Qty: 15 CAPSULE | Refills: 0 | Status: SHIPPED | OUTPATIENT
Start: 2024-09-22 | End: 2024-10-07

## (undated) DEVICE — 3M™ BAIR PAWS FLEX™ WARMING GOWN, SMALL, 20 PER CASE 81103: Brand: BAIR PAWS™